# Patient Record
Sex: FEMALE | Race: WHITE | NOT HISPANIC OR LATINO | URBAN - METROPOLITAN AREA
[De-identification: names, ages, dates, MRNs, and addresses within clinical notes are randomized per-mention and may not be internally consistent; named-entity substitution may affect disease eponyms.]

---

## 2018-11-08 ENCOUNTER — RECORDS - HEALTHEAST (OUTPATIENT)
Dept: LAB | Facility: CLINIC | Age: 43
End: 2018-11-08

## 2018-11-08 LAB
ANION GAP SERPL CALCULATED.3IONS-SCNC: 9 MMOL/L (ref 5–18)
BUN SERPL-MCNC: 15 MG/DL (ref 8–22)
CALCIUM SERPL-MCNC: 9.8 MG/DL (ref 8.5–10.5)
CHLORIDE BLD-SCNC: 105 MMOL/L (ref 98–107)
CO2 SERPL-SCNC: 27 MMOL/L (ref 22–31)
CREAT SERPL-MCNC: 0.74 MG/DL (ref 0.6–1.1)
GFR SERPL CREATININE-BSD FRML MDRD: >60 ML/MIN/1.73M2
GLUCOSE BLD-MCNC: 125 MG/DL (ref 70–125)
POTASSIUM BLD-SCNC: 3.7 MMOL/L (ref 3.5–5)
SODIUM SERPL-SCNC: 141 MMOL/L (ref 136–145)
TSH SERPL DL<=0.005 MIU/L-ACNC: 2.95 UIU/ML (ref 0.3–5)

## 2019-06-27 ENCOUNTER — AMBULATORY - HEALTHEAST (OUTPATIENT)
Dept: CARDIOLOGY | Facility: CLINIC | Age: 44
End: 2019-06-27

## 2019-06-27 ENCOUNTER — RECORDS - HEALTHEAST (OUTPATIENT)
Dept: ADMINISTRATIVE | Facility: OTHER | Age: 44
End: 2019-06-27

## 2019-07-03 ENCOUNTER — OFFICE VISIT - HEALTHEAST (OUTPATIENT)
Dept: CARDIOLOGY | Facility: CLINIC | Age: 44
End: 2019-07-03

## 2019-07-03 DIAGNOSIS — Q21.0 CONGENITAL MUSCULAR VENTRICULAR SEPTAL DEFECT: ICD-10-CM

## 2019-07-03 DIAGNOSIS — Q23.81 BICUSPID AORTIC VALVE: ICD-10-CM

## 2019-07-03 LAB
ATRIAL RATE - MUSE: 58 BPM
DIASTOLIC BLOOD PRESSURE - MUSE: NORMAL MMHG
INTERPRETATION ECG - MUSE: NORMAL
P AXIS - MUSE: 45 DEGREES
PR INTERVAL - MUSE: 134 MS
QRS DURATION - MUSE: 68 MS
QT - MUSE: 432 MS
QTC - MUSE: 424 MS
R AXIS - MUSE: 43 DEGREES
SYSTOLIC BLOOD PRESSURE - MUSE: NORMAL MMHG
T AXIS - MUSE: 49 DEGREES
VENTRICULAR RATE- MUSE: 58 BPM

## 2019-07-03 RX ORDER — IBUPROFEN 200 MG
200-600 TABLET ORAL PRN
Status: SHIPPED | COMMUNITY
Start: 2017-02-02

## 2019-07-03 ASSESSMENT — MIFFLIN-ST. JEOR: SCORE: 1199.74

## 2019-07-19 ENCOUNTER — HOSPITAL ENCOUNTER (OUTPATIENT)
Dept: CARDIOLOGY | Facility: CLINIC | Age: 44
Discharge: HOME | End: 2019-07-19
Attending: INTERNAL MEDICINE

## 2019-07-19 DIAGNOSIS — Q23.81 BICUSPID AORTIC VALVE: ICD-10-CM

## 2019-07-19 DIAGNOSIS — Q21.0 CONGENITAL MUSCULAR VENTRICULAR SEPTAL DEFECT: ICD-10-CM

## 2019-07-19 LAB
AORTIC ROOT: 2.7 CM
AORTIC VALVE MEAN VELOCITY: 102 CM/S
ASCENDING AORTA: 2.8 CM
AV DIMENSIONLESS INDEX VTI: 0.7
AV MEAN GRADIENT: 5 MMHG
AV PEAK GRADIENT: 8 MMHG
AV VALVE AREA: 2.1 CM2
AV VELOCITY RATIO: 0.7
BSA FOR ECHO PROCEDURE: 1.64 M2
CV BLOOD PRESSURE: ABNORMAL MMHG
CV ECHO HEIGHT: 59 IN
CV ECHO WEIGHT: 142 LBS
DOP CALC AO PEAK VEL: 141 CM/S
DOP CALC AO VTI: 30.4 CM
DOP CALC LVOT AREA: 3.14 CM2
DOP CALC LVOT DIAMETER: 2 CM
DOP CALC LVOT PEAK VEL: 94.7 CM/S
DOP CALC LVOT STROKE VOLUME: 63.1 CM3
DOP CALCLVOT PEAK VEL VTI: 20.1 CM
EJECTION FRACTION: 66 % (ref 55–75)
FRACTIONAL SHORTENING: 24.2 % (ref 28–44)
INTERVENTRICULAR SEPTUM IN END DIASTOLE: 0.8 CM (ref 0.6–0.9)
IVS/PW RATIO: 0.8
LA AREA 1: 10.9 CM2
LA AREA 2: 15.4 CM2
LEFT ATRIUM LENGTH: 5.38 CM
LEFT ATRIUM SIZE: 2.5 CM
LEFT ATRIUM VOLUME INDEX: 16.2 ML/M2
LEFT ATRIUM VOLUME: 26.5 ML
LEFT VENTRICLE CARDIAC INDEX: 2.1 L/MIN/M2
LEFT VENTRICLE CARDIAC OUTPUT: 3.4 L/MIN
LEFT VENTRICLE DIASTOLIC VOLUME INDEX: 32.3 CM3/M2 (ref 29–61)
LEFT VENTRICLE DIASTOLIC VOLUME: 53 CM3 (ref 46–106)
LEFT VENTRICLE HEART RATE: 54 BPM
LEFT VENTRICLE MASS INDEX: 49.4 G/M2
LEFT VENTRICLE SYSTOLIC VOLUME INDEX: 11 CM3/M2 (ref 8–24)
LEFT VENTRICLE SYSTOLIC VOLUME: 18 CM3 (ref 14–42)
LEFT VENTRICULAR INTERNAL DIMENSION IN DIASTOLE: 3.3 CM (ref 3.8–5.2)
LEFT VENTRICULAR INTERNAL DIMENSION IN SYSTOLE: 2.5 CM (ref 2.2–3.5)
LEFT VENTRICULAR MASS: 81.1 G
LEFT VENTRICULAR OUTFLOW TRACT MEAN GRADIENT: 2 MMHG
LEFT VENTRICULAR OUTFLOW TRACT MEAN VELOCITY: 63.7 CM/S
LEFT VENTRICULAR OUTFLOW TRACT PEAK GRADIENT: 4 MMHG
LEFT VENTRICULAR POSTERIOR WALL IN END DIASTOLE: 1 CM (ref 0.6–0.9)
LV STROKE VOLUME INDEX: 38.5 ML/M2
MITRAL VALVE E/A RATIO: 1.5
MV AVERAGE E/E' RATIO: 6.6 CM/S
MV DECELERATION TIME: 282 MS
MV E'TISSUE VEL-LAT: 12.1 CM/S
MV E'TISSUE VEL-MED: 10.8 CM/S
MV LATERAL E/E' RATIO: 6.2
MV MEDIAL E/E' RATIO: 7
MV PEAK A VELOCITY: 51.8 CM/S
MV PEAK E VELOCITY: 75.5 CM/S
NUC REST DIASTOLIC VOLUME INDEX: 2272 LBS
NUC REST SYSTOLIC VOLUME INDEX: 59 IN
TRICUSPID REGURGITATION PEAK PRESSURE GRADIENT: 24.4 MMHG
TRICUSPID VALVE ANULAR PLANE SYSTOLIC EXCURSION: 1.7 CM
TRICUSPID VALVE PEAK REGURGITANT VELOCITY: 247 CM/S

## 2019-07-19 ASSESSMENT — MIFFLIN-ST. JEOR: SCORE: 1199.74

## 2019-07-22 ENCOUNTER — AMBULATORY - HEALTHEAST (OUTPATIENT)
Dept: CARDIOLOGY | Facility: CLINIC | Age: 44
End: 2019-07-22

## 2019-07-22 DIAGNOSIS — Q21.0 CONGENITAL MUSCULAR VENTRICULAR SEPTAL DEFECT: ICD-10-CM

## 2019-07-22 DIAGNOSIS — Q23.81 BICUSPID AORTIC VALVE: ICD-10-CM

## 2021-04-02 ENCOUNTER — RECORDS - HEALTHEAST (OUTPATIENT)
Dept: LAB | Facility: CLINIC | Age: 46
End: 2021-04-02

## 2021-04-02 LAB
ANION GAP SERPL CALCULATED.3IONS-SCNC: 9 MMOL/L (ref 5–18)
BUN SERPL-MCNC: 15 MG/DL (ref 8–22)
CALCIUM SERPL-MCNC: 9.4 MG/DL (ref 8.5–10.5)
CHLORIDE BLD-SCNC: 107 MMOL/L (ref 98–107)
CO2 SERPL-SCNC: 26 MMOL/L (ref 22–31)
CREAT SERPL-MCNC: 0.79 MG/DL (ref 0.6–1.1)
GFR SERPL CREATININE-BSD FRML MDRD: >60 ML/MIN/1.73M2
GLUCOSE BLD-MCNC: 90 MG/DL (ref 70–125)
POTASSIUM BLD-SCNC: 4.3 MMOL/L (ref 3.5–5)
SODIUM SERPL-SCNC: 142 MMOL/L (ref 136–145)
TSH SERPL DL<=0.005 MIU/L-ACNC: 2.24 UIU/ML (ref 0.3–5)

## 2021-05-23 ENCOUNTER — HEALTH MAINTENANCE LETTER (OUTPATIENT)
Age: 46
End: 2021-05-23

## 2021-05-30 NOTE — PATIENT INSTRUCTIONS - HE
Yady,    It was a pleasure to see you today at the [facility].     My nurse or I will call you with the echocardiogram results.    Please call us if you have any questions or concerns about your heart.      Noé Perdomo M.D.

## 2021-06-03 VITALS — WEIGHT: 142 LBS | BODY MASS INDEX: 28.63 KG/M2 | HEIGHT: 59 IN

## 2021-06-03 VITALS — WEIGHT: 142 LBS | HEIGHT: 59 IN | BODY MASS INDEX: 28.63 KG/M2

## 2021-06-27 NOTE — PROGRESS NOTES
Progress Notes by Jatin Perdomo MD at 7/3/2019 10:30 AM     Author: Jatin Perdomo MD Service: -- Author Type: Physician    Filed: 7/3/2019 11:40 AM Encounter Date: 7/3/2019 Status: Signed    : Jatin Perdomo MD (Physician)           Click to link to [facility] Consultation Note    Thank you, Dr. Lima, for advising Yady Mccord to meet with me in consultation today at the [facility] to evaluate her history of congenital heart disease including a small muscular ventricular septal defect and a bicuspid aortic valve.     Assessment:    1. Bicuspid aortic valve  Echo Complete    ECG 12 lead with MUSE   2. Congenital muscular ventricular septal defect  Echo Complete    ECG 12 lead with MUSE       Discussion:    Yady states she saw a pediatric cardiologist for many years.  She is very aware of her history of the muscular ventricular septal defect and her favorable natural history.  She has not had any functional abnormalities of the aortic valve and does not have hypertension.    Plan:    1. Echocardiogram; we will call her with the results and further advice.  I counseled her that if there was any question about aortic abnormalities we would proceed with magnetic resonance angiography of the thoracic aorta.    An After Visit Summary was printed and given to the patient.    Current History:    I met with Yady in consultation this morning, as requested.  She states she has had a murmur since birth and was followed for many years by pediatric cardiology.  They were aware of her muscular ventricular septal defect, for which no further therapy was necessary.  She had an echocardiogram 3 years ago that was reviewed by Dr. Parul Sanchez who described that abnormality as well as a bicuspid aortic valve with normal function.  There were no abnormal aortic findings.    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    She exercises every weekend at the [facility].    Her system review is completely negative for cardiac problems.    Patient Active Problem List   Diagnosis   ? Bicuspid aortic valve   ? Congenital muscular ventricular septal defect       Past Medical History:  Past Medical History:   Diagnosis Date   ? Asthma     rescue inhaler, better since pregnancy, hospitalized frequently as a child   ? Bicuspid aortic valve 2016    normal function per echo report of Dr. Sanchez   ? Congenital muscular ventricular septal defect 1977    per echo report of Dr. Sanchez       Past Surgical History:  Past Surgical History:   Procedure Laterality Date   ? BILATERAL OOPHORECTOMY     ?  SECTION, LOW TRANSVERSE  2012    second time in    ? MASTECTOMY, RADICAL Bilateral     bilateral for BRAC gene       Family History:  Family History   Problem Relation Age of Onset   ? Breast cancer Mother 32        and other breast at 46   ? Ovarian cancer Mother 57        in remission   ? No Medical Problems Father    ? No Medical Problems Sister    ? No Medical Problems Daughter    ? No Medical Problems Daughter    ? Sudden death Paternal Grandfather 45        heavy smoker       Social History:   reports that she has never smoked. She has never used smokeless tobacco. She reports that she drinks about 1.2 oz of alcohol per week. She reports that she does not use drugs.    Medications:  Outpatient Encounter Medications as of 7/3/2019   Medication Sig Dispense Refill   ? ibuprofen (ADVIL,MOTRIN) 200 MG tablet Take 200-600 mg by mouth as needed.     ? melatonin 5 mg Tab tablet Take 5 mg by mouth at bedtime as needed.       No facility-administered encounter medications on file as of 7/3/2019.        Allergies:  D and c red no.22 and Erythromycin    Review of Systems:     General: Weight Gain  Eyes: WNL  Ears/Nose/Throat: WNL  Lungs: WNL  Heart: WNL  Stomach: WNL  Bladder: WNL  Muscle/Joints: WNL  Skin:  "WNL  Nervous System: WNL  Mental Health: WNL     Blood: WNL    Objective:     Physical Exam:  Wt Readings from Last 5 Encounters:   07/03/19 142 lb (64.4 kg)      4' 11\" (1.499 m)  Body mass index is 28.68 kg/m .  BP 92/72 (Patient Site: Left Arm, Patient Position: Sitting, Cuff Size: Adult Large)   Pulse 68   Resp 16   Ht 4' 11\" (1.499 m)   Wt 142 lb (64.4 kg)   BMI 28.68 kg/m      General Appearance: Alert and not in distress   HEENT: No scleral icterus; the tongue is midline and the mucous membranes are pink and moist   Neck: No cervical bruits, adenopathy, or thyromegaly; jugular venous pressure is less than 5 cm   Chest: The spine is straight and the chest is symmetric   Lungs: Respirations are unlabored; the lungs are clear to auscultation   Cardiovasular: Auscultation reveals normal first and second heart sounds with a soft holosystolic murmur at the left sternal border; the carotid, radial, femoral, and posterior tibial pulses are intact   Abdomen: No organomegaly, masses, bruits, or tenderness; bowel sounds are present   Extremities: No cyanosis, clubbing or edema   Skin: No xanthelasma   Neurologic: Mood and affect are appropriate       Cardiac testing:    EKG: Sinus bradycardia, normal EKG per my personal review.    Echocardiogram: ordered    Imaging:    No results found.    Lab Review:    Lab Results   Component Value Date     11/08/2018    K 3.7 11/08/2018     11/08/2018    CO2 27 11/08/2018    BUN 15 11/08/2018    CREATININE 0.74 11/08/2018    CALCIUM 9.8 11/08/2018     Lab Results   Component Value Date    CHOL 200 (H) 04/08/2016    TRIG 136 04/08/2016    HDL 59 04/08/2016     Creatinine (mg/dL)   Date Value   11/08/2018 0.74     LDL Calculated (mg/dL)   Date Value   04/08/2016 114           Much or all of the text in this note was generated through the use of the Dragon Dictate voice-to-text software. Errors in spelling or words which seem out of context are unintentional. Sound " alike errors, in particular, may have escaped editing.

## 2021-09-12 ENCOUNTER — HEALTH MAINTENANCE LETTER (OUTPATIENT)
Age: 46
End: 2021-09-12

## 2022-06-19 ENCOUNTER — HEALTH MAINTENANCE LETTER (OUTPATIENT)
Age: 47
End: 2022-06-19

## 2022-11-19 ENCOUNTER — HEALTH MAINTENANCE LETTER (OUTPATIENT)
Age: 47
End: 2022-11-19

## 2023-07-01 ENCOUNTER — HEALTH MAINTENANCE LETTER (OUTPATIENT)
Age: 48
End: 2023-07-01

## 2025-08-17 ENCOUNTER — ANCILLARY PROCEDURE (OUTPATIENT)
Dept: GENERAL RADIOLOGY | Facility: CLINIC | Age: 50
End: 2025-08-17
Attending: PHYSICIAN ASSISTANT
Payer: COMMERCIAL

## 2025-08-17 ENCOUNTER — OFFICE VISIT (OUTPATIENT)
Dept: URGENT CARE | Facility: URGENT CARE | Age: 50
End: 2025-08-17
Payer: COMMERCIAL

## 2025-08-17 VITALS
TEMPERATURE: 98 F | OXYGEN SATURATION: 95 % | RESPIRATION RATE: 19 BRPM | DIASTOLIC BLOOD PRESSURE: 83 MMHG | HEIGHT: 59 IN | HEART RATE: 59 BPM | SYSTOLIC BLOOD PRESSURE: 121 MMHG | BODY MASS INDEX: 32.25 KG/M2 | WEIGHT: 160 LBS

## 2025-08-17 DIAGNOSIS — S99.921A FOOT INJURY, RIGHT, INITIAL ENCOUNTER: ICD-10-CM

## 2025-08-17 DIAGNOSIS — S93.601A FOOT SPRAIN, RIGHT, INITIAL ENCOUNTER: Primary | ICD-10-CM

## 2025-08-17 PROCEDURE — 73630 X-RAY EXAM OF FOOT: CPT | Mod: TC | Performed by: RADIOLOGY

## 2025-08-19 ENCOUNTER — RESULTS FOLLOW-UP (OUTPATIENT)
Dept: URGENT CARE | Facility: URGENT CARE | Age: 50
End: 2025-08-19
Payer: COMMERCIAL

## 2025-08-19 DIAGNOSIS — S92.354A CLOSED NONDISPLACED FRACTURE OF FIFTH METATARSAL BONE OF RIGHT FOOT, INITIAL ENCOUNTER: Primary | ICD-10-CM
